# Patient Record
Sex: MALE | Race: ASIAN | NOT HISPANIC OR LATINO | URBAN - METROPOLITAN AREA
[De-identification: names, ages, dates, MRNs, and addresses within clinical notes are randomized per-mention and may not be internally consistent; named-entity substitution may affect disease eponyms.]

---

## 2017-09-01 ENCOUNTER — EMERGENCY (EMERGENCY)
Facility: HOSPITAL | Age: 57
LOS: 1 days | Discharge: HOME | End: 2017-09-01
Attending: EMERGENCY MEDICINE | Admitting: EMERGENCY MEDICINE
Payer: MEDICAID

## 2017-09-01 VITALS
RESPIRATION RATE: 18 BRPM | SYSTOLIC BLOOD PRESSURE: 162 MMHG | HEART RATE: 68 BPM | OXYGEN SATURATION: 97 % | DIASTOLIC BLOOD PRESSURE: 105 MMHG | TEMPERATURE: 98 F

## 2017-09-01 VITALS
TEMPERATURE: 98 F | DIASTOLIC BLOOD PRESSURE: 91 MMHG | RESPIRATION RATE: 16 BRPM | SYSTOLIC BLOOD PRESSURE: 161 MMHG | HEART RATE: 70 BPM | OXYGEN SATURATION: 97 %

## 2017-09-01 DIAGNOSIS — S02.92XS UNSPECIFIED FRACTURE OF FACIAL BONES, SEQUELA: ICD-10-CM

## 2017-09-01 DIAGNOSIS — Z98.890 OTHER SPECIFIED POSTPROCEDURAL STATES: Chronic | ICD-10-CM

## 2017-09-01 PROCEDURE — 90471 IMMUNIZATION ADMIN: CPT

## 2017-09-01 PROCEDURE — 99283 EMERGENCY DEPT VISIT LOW MDM: CPT | Mod: 25

## 2017-09-01 PROCEDURE — 99284 EMERGENCY DEPT VISIT MOD MDM: CPT

## 2017-09-01 PROCEDURE — 90715 TDAP VACCINE 7 YRS/> IM: CPT

## 2017-09-01 PROCEDURE — 31238 NSL/SINS NDSC SRG NSL HEMRRG: CPT

## 2017-09-01 PROCEDURE — 99284 EMERGENCY DEPT VISIT MOD MDM: CPT | Mod: 25

## 2017-09-01 RX ORDER — TETANUS TOXOID, REDUCED DIPHTHERIA TOXOID AND ACELLULAR PERTUSSIS VACCINE, ADSORBED 5; 2.5; 8; 8; 2.5 [IU]/.5ML; [IU]/.5ML; UG/.5ML; UG/.5ML; UG/.5ML
0.5 SUSPENSION INTRAMUSCULAR ONCE
Qty: 0 | Refills: 0 | Status: COMPLETED | OUTPATIENT
Start: 2017-09-01 | End: 2017-09-01

## 2017-09-01 RX ORDER — IBUPROFEN 200 MG
400 TABLET ORAL ONCE
Qty: 0 | Refills: 0 | Status: COMPLETED | OUTPATIENT
Start: 2017-09-01 | End: 2017-09-01

## 2017-09-01 RX ADMIN — Medication 1 TABLET(S): at 16:40

## 2017-09-01 RX ADMIN — Medication 400 MILLIGRAM(S): at 16:40

## 2017-09-01 RX ADMIN — TETANUS TOXOID, REDUCED DIPHTHERIA TOXOID AND ACELLULAR PERTUSSIS VACCINE, ADSORBED 0.5 MILLILITER(S): 5; 2.5; 8; 8; 2.5 SUSPENSION INTRAMUSCULAR at 16:42

## 2017-09-01 NOTE — ED PROVIDER NOTE - PROGRESS NOTE DETAILS
ATTD: patient was evaluated by Plastic / Facial Surgery, Trauma Surgery and ENT specialist and no surgical intervention needed at this time. rec abx - augmentin and to follow with specialist as outpt. nasal precuations. provided with copy results Vision intact, EOMI with no pain with movement, will have ophthalmology evaluate as outpatient.  - Milton Steel MD
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
## 2017-09-01 NOTE — ED PROVIDER NOTE - NS ED ROS FT
ROS: GENERAL: no fevers, no chills HEENT: +facial pain, no epistaxis, no eye pain, no vision changes, no ear pain, no throat pain CARDIAC: no chest pain, no shortness of breath PULM: no cough, no shortness of breath GI: no nausea, no vomiting, no diarrhea, no abdominal pain, no hematemesis, no bright red blood per rectum : no dysuria, no hematuria EXTREMITIES: no arm pain, no leg pain, no back pain SKIN: + purpura, +laceration, no petechiae NEURO: + headache, no neck pain, no loss of strength/sensation HEME: no easy bruising, no easy bleeding
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
## 2017-09-01 NOTE — ED PROVIDER NOTE - PHYSICAL EXAMINATION
PE: CONSTITUTIONAL: Well appearing, well nourished, in no apparent distress. ENMT: Airway patent, nasal mucosa clear, mouth with normal mucosa. HEAD: Right face with ecchymosis with maxillary sinus TTP, right eyelid ecchymotic and swollen EYES: PERRL, EOMI bilaterally without pain, mildly injected conjunctiva, vision intact bilaterally CARDIAC: RRR, no m/r/g, no pedal edema RESPIRATORY: CTA bilaterally, no adventitious sounds GI: Abdomen non-distended, non-tender MSK: Spine appears normal, range of motion is not limited, no muscle/joint tenderness other than face NEURO: CNII-XII grossly intact, 5/5 strength, full sensation all extremities, gait intact SKIN: 2cm healing superficial laceration along the left anterior neck, ecchymosis and swelling of right periorbital region PE: CONSTITUTIONAL: Well appearing, well nourished, in no apparent distress. ENMT: Airway patent, nasal mucosa clear with TTP of the nasal bridge, mouth with normal mucosa. HEAD: Right face with ecchymosis with maxillary sinus TTP, right eyelid ecchymotic and swollen EYES: PERRL, EOMI bilaterally without pain, mildly injected conjunctiva, vision intact bilaterally CARDIAC: RRR, no m/r/g, no pedal edema RESPIRATORY: CTA bilaterally, no adventitious sounds GI: Abdomen non-distended, non-tender MSK: Spine appears normal, range of motion is not limited, no muscle/joint tenderness other than face NEURO: CNII-XII grossly intact, 5/5 strength, full sensation all extremities, gait intact SKIN: 2cm healing superficial laceration along the left anterior neck, ecchymosis and swelling of right periorbital region
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
## 2017-09-01 NOTE — CONSULT NOTE ADULT - PROBLEM SELECTOR RECOMMENDATION 9
- Abx for open fracture   - follow up with Ophthalmologist as out patient   - follow up with ENT if any increase in facial swelling - Abx for open fracture   - Ophthalmologist evaluation   - follow up as outpt to assure resolution. call if any concerning signs - discussed what to be concerned about
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
## 2019-03-05 NOTE — ED SUB INTERN NOTE - NEURO NEGATIVE STATEMENT, MLM
gradual onset
no loss of consciousness, no gait abnormality, no sensory deficits, and no weakness; positive headache

## 2025-05-07 NOTE — CONSULT NOTE ADULT - CONSULT REASON
Facial fracture